# Patient Record
(demographics unavailable — no encounter records)

---

## 2024-10-07 NOTE — HISTORY OF PRESENT ILLNESS
[N] : Patient denies prior pregnancies [Normal Amount/Duration] :  normal amount and duration [Menstrual Cramps] : menstrual cramps [Never active] : never active [FreeTextEntry1] : 09/29/2024

## 2024-10-07 NOTE — PHYSICAL EXAM
[Chaperone Present] : A chaperone was present in the examining room during all aspects of the physical examination [[id number]] : A chaperone was present during the pelvic exam. [FreeTextEntry2] : Loly [Appropriately responsive] : appropriately responsive [Alert] : alert [No Acute Distress] : no acute distress [No Lymphadenopathy] : no lymphadenopathy [Regular Rate Rhythm] : regular rate rhythm [No Murmurs] : no murmurs [Clear to Auscultation B/L] : clear to auscultation bilaterally [Soft] : soft [Non-tender] : non-tender [Non-distended] : non-distended [No Lesions] : no lesions [No Mass] : no mass [Oriented x3] : oriented x3 [Examination Of The Breasts] : a normal appearance [No Masses] : no breast masses were palpable [Labia Majora] : normal [Labia Minora] : normal [Normal] : normal [FreeTextEntry4] : deferred full pelvic exam because never s/a

## 2024-10-07 NOTE — PLAN
[FreeTextEntry1] : new pt annual: never s/a so will defer pap test for now  does have irreg periods: just got period but before that lmp August notices when losing wt does get more regular, does have some hair growth: dw pt pcos likely does not want ocp but explained possible herbal remedies  *telehealth suggested to f/u discussion as I think she will have high A1C etc. I did give handout regarding berberine, seeing PMD Nov 7, told her to ask about metformin  thinks she did get hpv vaccine

## 2024-10-21 NOTE — PLAN
[FreeTextEntry1] : here to discuss lab results / sonogram done as well for suspected PCO and then lengthy discussion regarding various meds and referral to endocrinologist for all:  1. elevated TSH read, noted past readings ok, explained may be mildly hypothyroid 2. irregular periods and has elevated testosterone explained I do think PCO likely and ovaries on sono d/w 3. elevated testosterone mildly: explained likely from pcos 4. A1C 5.8 this is not new info as this has been high yrs in past  given all above d/w pt various med management including OCP she does not want ocp/ not sa r/b/a  metformin discussed: she is more willing to try this r/b/a also d/w pt spironolactone for acne/ some chin hair growth d/w pt wt loss/ watching sugar/ exercise  advised to do med management f/u with me in 3 months I did also refer to endocrine

## 2024-10-21 NOTE — PROCEDURE
[Suspected Polycystic Ovaries] : suspected polycystic ovaries [Transvaginal Ultrasound] : transvaginal ultrasound [Retroverted] : retroverted [FreeTextEntry3] : RV to axial uterus: hard to delineate due to angle through sonographic waves but appears to be grossly normal ovaries seen bilaterally and have a general PCO appearance [FreeTextEntry7] : no cysts or masses but has PCO appearance [FreeTextEntry8] : no cysts or masses but has PCO appearance

## 2025-01-06 NOTE — HISTORY OF PRESENT ILLNESS
[LMP unknown] : LMP unknown [Y] : Patient reports abnormal menses [N] : Patient denies prior pregnancies [No] : Patient does not have concerns regarding sex [FreeTextEntry1] : 23 y/o pt presents in office today for follow up visit in regard to PCOS. [LMPDate] : 12/21/24

## 2025-01-06 NOTE — PHYSICAL EXAM
[Appropriately responsive] : appropriately responsive [Alert] : alert [No Acute Distress] : no acute distress [Soft] : soft [Non-tender] : non-tender [Non-distended] : non-distended [No Lesions] : no lesions [No Mass] : no mass [Oriented x3] : oriented x3 [Normal] : normal external genitalia [Labia Majora] : normal [Labia Minora] : normal

## 2025-01-06 NOTE — PLAN
[FreeTextEntry1] : multiple issues d/w pt today 1. elevated TSH read, noted past readings ok, explained may be mildly hypothyroid repeat level again today plus antithyroid ab I had referred to endocrine but she hasn't been able to get in yet, referral renewed 2. irregular periods and has elevated testosterone explained I do think PCO , she did not want pill did start metformin but not so c/w it, told her to take with meals 3. elevated testosterone mildly: explained likely from pcos 4. A1C 5.8: d/w pt may be lower now on spironolactone check potassium today as well this is not new info as this has been high yrs in past  given all above d/w pt various med management including OCP she does not want ocp/ not sa r/b/a  metformin discussed: she is more willing to try this r/b/a also d/w pt spironolactone for acne/ some chin hair growth d/w pt wt loss/ watching sugar/ exercise  advised to do med management f/u with me in 6 months I did also refer to endocrine.  Over 50% of visit counseling and coordination of care( 30m)

## 2025-05-13 NOTE — REVIEW OF SYSTEMS
[Irregular Menses] : irregular menses [Acanthosis] : acanthosis [Acne] : acne [Hirsutism] : hirsutism [Fatigue] : no fatigue [Recent Weight Gain (___ Lbs)] : no recent weight gain [Recent Weight Loss (___ Lbs)] : no recent weight loss [Fever] : no fever [Visual Field Defect] : no visual field defect [Chest Pain] : no chest pain [Shortness Of Breath] : no shortness of breath [Nausea] : no nausea [Constipation] : no constipation [Vomiting] : no vomiting [Diarrhea] : no diarrhea [Headaches] : no headaches [Galactorrhea] : no galactorrhea

## 2025-05-13 NOTE — HISTORY OF PRESENT ILLNESS
[FreeTextEntry1] : Ms. CATRACHO BUCK is a 22-year-old woman with prediabetes, PCOS and class 2 obesity who presents to the clinic.  Ms. Buck presented for evaluation and management of polycystic ovary syndrome (PCOS) and pre-diabetes. She was initially diagnosed with PCOS in October 2024 after consulting with her gynecologist due to symptoms including excessive hair growth and irregular menstrual cycles. She also was noted to have multiple ovarian cysts on pelvic ultrasound.   Ms. Buck reported that her first menstrual cycle occurred at age 10. Her cycles were regular until approximately age 16-17 (during her josué year), when they became irregular. She did not seek medical attention for this issue until recently. Currently, her periods occur approximately every other month and last for about one week. She described the flow as heavy in the middle days but otherwise normal.  She reported excessive acne beginning around age 16, for which she has seen a dermatologist and been prescribed retinol and benzoyl peroxide. She also uses regular cleanser and moisturizer. She feels her acne is currently under control.  Ms. Buck noted excessive hair growth primarily on her upper and back thighs, and occasionally under her chin. Using the Ferriman-Gallway scale for hirsutism assessment, she scored a 7. She denied any breast secretions, vision changes, or family history of similar problems.  Regarding medications, she was prescribed metformin 500 mg twice daily but reported inconsistent adherence due to initial gastrointestinal side effects (diarrhea), which improved when taking the medication with food. She has been trying to be more consistent with taking it recently. She has been taking spironolactone 50 mg daily as well since October 2024 and feels it has been helpful. She denied experiencing any side effects from spironolactone. She noted improvement in her menstrual cycle regularity, having had periods in January, February, and March, though she missed her period in April.  Ms. Buck declined oral contraceptive pills previously offered by her gynecologist, expressing concerns about potential side effects and preferring to try alternative treatments.  Regarding weight management efforts, she reported making dietary changes including reducing processed foods, sugar, and fast food consumption. She lives with her mother who has been supportive by purchasing healthier food options such as fruits, vegetables, and Greek yogurt. Her typical diet includes: - Breakfast (2-3 times weekly): Greek yogurt with strawberries and blueberries with coffee OR buys coffee and a croissant from supermarket when rushing. - Lunch: Often fast-food including sushi (8 pieces) or [employer]ipotle. She has stopped consuming [employer], or [employer]avia[employer] - Dinner: Home-cooked meals prepared by her mother, including chicken salad, pasta, Cook Islander-inspired salad with vegetables, or tacos - Minimal snacking, primarily drinks water, occasionally juice, and frequently consumes sparkling water or seltzer - Avoids soda but consumes sweets every other day (candy, Starbucks cake pops).  She denied regular exercise but expressed willingness to start taking walks and using the gym at her school.   She was noted to have a mildly elevated TSH in two occasions, with the latest one at 8.5. She reported occasional fatigue but denied cold intolerance, constipation, or other symptoms of hypothyroidism. The patient reports experiencing fatigue. Denies cold intolerance, weight gain, constipation. She denies having any recent viral infection, recent iodinated contrast study, prior exposure to radiation, amiodarone or lithium. Denies having family history of thyroid cancer. Denies autoimmune conditions in the family.  Recent labs (1/7/25) - TSH 8.3 - Free T4 1.0 - A1C 6.0% - Testosterone 55.6 - Prolactin 24.2 - FSH 4.5 - Anti-thyroid antibodies: negative.   10/8/24: - TSH 5.2 - Free 1.0

## 2025-05-13 NOTE — ASSESSMENT
[FreeTextEntry1] : 1. Polycystic Ovary Syndrome (PCOS) / Obesity class 2 - Diagnosis confirmed by gynecologist based on clinical symptoms (irregular menstrual cycles, hirsutism, acne) and ultrasound findings of ovarian cysts. Check 17-OHP and DHEAS.  - Medical treatment: Increased spironolactone from 50 mg daily to 50 mg twice daily to better address hirsutism. Will continue metformin but switching to extended-release formulation to reduce gastrointestinal side effects. Discussed mechanism of action of both medications - spironolactone blocks androgen effects while metformin addresses insulin resistance which may be driving hormonal imbalance. - Lifestyle modifications: Provided dietary recommendations including continuing Greek yogurt with fruit for breakfast, considering skim milk or almond milk instead of regular milk in coffee, bringing homemade sandwiches for lunch instead of fast food, incorporating more sashimi instead of rice-based sushi, measuring portions of home-cooked meals. Encouraged beginning an exercise routine, starting with walks and gradually incorporating gym workouts.  2. Pre-diabetes - Assessment: Confirmed by A1C of 6%. Most recent A1C has decreased to 5.5% while on metformin.  -  Continuing metformin 500 mg twice daily; however, switching to extended-release formulation. - Lifestyle modifications: Same dietary and exercise recommendations as for PCOS management. - Referral: Provided referral to nutritionist for more specific dietary guidance.  3. Subclinical Hypothyroidism - Based on elevated TSH (8.3) with normal Free T4 levels. Has negative thyroid antibodies. Patient denies most symptoms of hypothyroidism except occasional fatigue. - Ordered repeat thyroid function tests today. - Will consider thyroid hormone supplementation if TSH remains elevated >7.5.  RTC in 3 months

## 2025-05-13 NOTE — ADDENDUM
[FreeTextEntry1] : Time-Based Billing: I have spent 55 minutes on the encounter. This includes time spent with the patient during the visit as well as time spent before and after the visit reviewing the chart, documenting the encounter, reviewing studies, etc.